# Patient Record
Sex: FEMALE | Race: WHITE | NOT HISPANIC OR LATINO | Employment: FULL TIME | URBAN - METROPOLITAN AREA
[De-identification: names, ages, dates, MRNs, and addresses within clinical notes are randomized per-mention and may not be internally consistent; named-entity substitution may affect disease eponyms.]

---

## 2022-08-13 ENCOUNTER — HOSPITAL ENCOUNTER (EMERGENCY)
Facility: CLINIC | Age: 37
Discharge: HOME | End: 2022-08-13
Attending: EMERGENCY MEDICINE | Admitting: EMERGENCY MEDICINE
Payer: COMMERCIAL

## 2022-08-13 VITALS
RESPIRATION RATE: 18 BRPM | DIASTOLIC BLOOD PRESSURE: 62 MMHG | TEMPERATURE: 98.2 F | OXYGEN SATURATION: 97 % | WEIGHT: 143 LBS | HEART RATE: 63 BPM | SYSTOLIC BLOOD PRESSURE: 99 MMHG

## 2022-08-13 DIAGNOSIS — S09.90XA CLOSED HEAD INJURY, INITIAL ENCOUNTER: ICD-10-CM

## 2022-08-13 PROCEDURE — 250N000013 HC RX MED GY IP 250 OP 250 PS 637: Performed by: EMERGENCY MEDICINE

## 2022-08-13 PROCEDURE — 99283 EMERGENCY DEPT VISIT LOW MDM: CPT

## 2022-08-13 RX ORDER — IBUPROFEN 600 MG/1
600 TABLET, FILM COATED ORAL ONCE
Status: COMPLETED | OUTPATIENT
Start: 2022-08-13 | End: 2022-08-13

## 2022-08-13 RX ADMIN — IBUPROFEN 600 MG: 600 TABLET ORAL at 05:26

## 2022-08-13 ASSESSMENT — ENCOUNTER SYMPTOMS
HEADACHES: 1
DIZZINESS: 0
NECK PAIN: 0
WOUND: 1

## 2022-08-13 NOTE — ED PROVIDER NOTES
History   Chief Complaint:  Head Injury       HPI   Pretty Westbrook is a 36 year old female who presents via EMS after a fall. Per the patient, tonight she was at a friend's house drinking and playing a [employer] challenge game involving slapping each other with tortillas. She states she choked on water, hit her chin on the counter and hit the back of her head on the ground. She was unconscious for a period of time. She reports swelling on the back of her head and a mild headache. She denies any other injury, bleeding, neck pain, dizziness, or visual disturbance. She states she feels fine now. Per EMS, she did not initially want to come to the ED but was encouraged by her family.     Review of Systems   Eyes: Negative for visual disturbance.   Musculoskeletal: Negative for neck pain.   Skin: Positive for wound (swelling posterior head).   Neurological: Positive for headaches. Negative for dizziness.   All other systems reviewed and are negative.      Allergies:  The patient has no known allergies.     Medications:  No medications reported.     Past Medical History:     No past medical history reported.      Social History:  The patient presents to the ED with a friend.   The patient arrived via EMS.   PCP: No primary care provider on file.     Physical Exam     Patient Vitals for the past 24 hrs:   BP Temp Temp src Pulse Resp SpO2 Weight   08/13/22 0550 -- -- -- -- -- 99 % --   08/13/22 0545 -- -- -- -- -- 98 % --   08/13/22 0540 -- -- -- -- -- 98 % --   08/13/22 0535 -- -- -- -- -- 98 % --   08/13/22 0530 99/62 -- -- 63 -- 97 % --   08/13/22 0521 -- 98.2  F (36.8  C) Oral -- -- -- 64.9 kg (143 lb)   08/13/22 0517 106/47 -- -- 65 18 98 % --       Physical Exam  Constitutional: Alert, attentive, GCS 15  HENT:     Nose: Nose normal.   Mouth/Throat: Oropharynx is clear, mucous membranes are moist   Ears: Normal external ears. TMs clear bilaterally, normal external canals bilaterally.  Eyes: EOM are normal.    CV:  Regular rate and rhythm, no murmurs, rubs or gallups.  Chest: Effort normal and breath sounds normal.   GI: No distension. There is no tenderness.  MSK: Normal range of motion.   Neurological: Alert, attentive   Normal strength and sensation   CN 2-12 intact   Clear speech  Skin: Skin is warm and dry.      Emergency Department Course     Emergency Department Course:    Reviewed:  I reviewed nursing notes, vitals and past medical history    Assessments:  0510 I obtained history and examined the patient as noted above.   0544 I rechecked the patient and explained findings.   0550 I rechecked the patient and explained findings.     Interventions:  0526 ibuprofen (ADVIL/MOTRIN) tablet 600 mg     Disposition:  The patient was discharged to home.     Impression & Plan     Medical Decision Making:  This is a pleasant 36-year-old female presents for evaluation of closed head injury.  She does have a small scalp hematoma but no red flag symptoms or signs to suggest skull fracture or intracranial injury otherwise.  She was observed in the ED without progression of symptoms.  She is clinically sober.  I had a shared decision-making conversation with the patient who agrees to defer CT imaging at this time.  Plan continue supportive cares and avoidance of head injury until cleared from likely mild concussion symptoms.  Primary care follow-up in 3 to 5 days and return precautions were severe headache, vomiting, or any other concerns.      Diagnosis:    ICD-10-CM    1. Closed head injury, initial encounter  S09.90XA      Scribe Disclosure:  Sravan YA, am serving as a scribe at 5:15 AM on 8/13/2022 to document services personally performed by Bebeto Lazcano MD based on my observations and the provider's statements to me.           Bebeto Lazcano MD  08/13/22 0609

## 2022-08-13 NOTE — ED TRIAGE NOTES
Pt arrives from a friend's house following a fall. EMS reports pt was drinking with her friend and sister, they were slapping each other with tortillas, briefly choked on water due to laughing, and hit her chin on the counter, and then fell backwards hitting her head on tile floor. Pt was unconscious for several minutes. Large bump to posterior head, pt arrives A&O x 4. Pt is not on blood thinners.  en route.

## 2023-03-17 ENCOUNTER — LAB REQUISITION (OUTPATIENT)
Dept: LAB | Facility: CLINIC | Age: 38
End: 2023-03-17
Payer: COMMERCIAL

## 2023-03-17 DIAGNOSIS — Z11.3 ENCOUNTER FOR SCREENING FOR INFECTIONS WITH A PREDOMINANTLY SEXUAL MODE OF TRANSMISSION: ICD-10-CM

## 2023-03-17 PROCEDURE — 87491 CHLMYD TRACH DNA AMP PROBE: CPT | Performed by: PHYSICIAN ASSISTANT

## 2023-03-18 LAB
C TRACH DNA SPEC QL PROBE+SIG AMP: NEGATIVE
N GONORRHOEA DNA SPEC QL NAA+PROBE: NEGATIVE

## 2024-12-11 ENCOUNTER — LAB REQUISITION (OUTPATIENT)
Dept: LAB | Facility: CLINIC | Age: 39
End: 2024-12-11

## 2024-12-11 DIAGNOSIS — Z12.4 ENCOUNTER FOR SCREENING FOR MALIGNANT NEOPLASM OF CERVIX: ICD-10-CM

## 2024-12-11 PROCEDURE — 87624 HPV HI-RISK TYP POOLED RSLT: CPT | Performed by: OBSTETRICS & GYNECOLOGY

## 2024-12-12 LAB
HPV HR 12 DNA CVX QL NAA+PROBE: NEGATIVE
HPV16 DNA CVX QL NAA+PROBE: NEGATIVE
HPV18 DNA CVX QL NAA+PROBE: NEGATIVE
HUMAN PAPILLOMA VIRUS FINAL DIAGNOSIS: NORMAL

## 2024-12-16 LAB
BKR AP ASSOCIATED HPV REPORT: NORMAL
BKR LAB AP GYN ADEQUACY: NORMAL
BKR LAB AP GYN INTERPRETATION: NORMAL
BKR LAB AP LMP: NORMAL
BKR LAB AP PREVIOUS ABNL DX: NORMAL
BKR LAB AP PREVIOUS ABNORMAL: NORMAL
PATH REPORT.COMMENTS IMP SPEC: NORMAL
PATH REPORT.COMMENTS IMP SPEC: NORMAL
PATH REPORT.RELEVANT HX SPEC: NORMAL